# Patient Record
Sex: MALE | Race: BLACK OR AFRICAN AMERICAN | URBAN - METROPOLITAN AREA
[De-identification: names, ages, dates, MRNs, and addresses within clinical notes are randomized per-mention and may not be internally consistent; named-entity substitution may affect disease eponyms.]

---

## 2020-03-04 ENCOUNTER — EMERGENCY (EMERGENCY)
Facility: HOSPITAL | Age: 55
LOS: 1 days | Discharge: HOME | End: 2020-03-04
Attending: EMERGENCY MEDICINE | Admitting: EMERGENCY MEDICINE
Payer: SELF-PAY

## 2020-03-04 VITALS
OXYGEN SATURATION: 97 % | RESPIRATION RATE: 18 BRPM | DIASTOLIC BLOOD PRESSURE: 76 MMHG | WEIGHT: 160.06 LBS | HEIGHT: 71 IN | HEART RATE: 79 BPM | SYSTOLIC BLOOD PRESSURE: 116 MMHG | TEMPERATURE: 98 F

## 2020-03-04 DIAGNOSIS — L02.01 CUTANEOUS ABSCESS OF FACE: ICD-10-CM

## 2020-03-04 DIAGNOSIS — R22.0 LOCALIZED SWELLING, MASS AND LUMP, HEAD: ICD-10-CM

## 2020-03-04 PROCEDURE — 99284 EMERGENCY DEPT VISIT MOD MDM: CPT | Mod: 25

## 2020-03-04 PROCEDURE — 10061 I&D ABSCESS COMP/MULTIPLE: CPT

## 2020-03-04 RX ORDER — IBUPROFEN 200 MG
600 TABLET ORAL ONCE
Refills: 0 | Status: COMPLETED | OUTPATIENT
Start: 2020-03-04 | End: 2020-03-04

## 2020-03-04 RX ADMIN — Medication 600 MILLIGRAM(S): at 17:09

## 2020-03-04 RX ADMIN — Medication 100 MILLIGRAM(S): at 17:09

## 2020-03-04 NOTE — ED PROVIDER NOTE - OBJECTIVE STATEMENT
53 y/o M presents to ED c/o right side facial swelling with associated pain and drainage that began Monday. Of note, pt states he was told recently that he needed dental work and he did not follow-up. Denies trauma, headache, N/V, fever, chills, chest pain, SOB, back pain, or neck pain. 53 y/o M presents to ED c/o right side facial swelling with associated pain and drainage that began Monday after shaving. Of note, pt states he was told recently that he needed dental work and he did not follow-up, though denies pain with eating/chewing. Denies trauma, headache, N/V, fever, chills, chest pain, SOB, back pain, or neck pain.

## 2020-03-04 NOTE — ED ADULT TRIAGE NOTE - CHIEF COMPLAINT QUOTE
BIBA for right sided facial swelling/pain since Monday. as per pt, has had the swelling happen before and he needed to do dental work but did not do it. denies trauma to area. took a total of 4 Aleve today PTA.

## 2020-03-04 NOTE — ED ADULT NURSE NOTE - CHIEF COMPLAINT QUOTE
BIBA for right sided facial swelling/pain since Monday. as per pt, has had the swelling happen before and he needed to do dental work but did not do it. denies trauma to area. took a total of 4 Aleve today PTA.
180.34

## 2020-03-04 NOTE — ED PROVIDER NOTE - PHYSICAL EXAMINATION
VITAL SIGNS: I have reviewed nursing notes and confirm.  CONSTITUTIONAL: Well-developed; well-nourished; in no acute distress.  HEAD: Normocephalic; atraumatic.  EYES: PERRL, EOM intact; conjunctiva and sclera clear.  ENT: No nasal discharge; airway clear.  NECK: Supple; non tender.  CARD: S1, S2 normal; no murmurs, gallops, or rubs. Regular rate and rhythm.  RESP: Unlabored. No wheezes, rales or rhonchi.  EXT: Normal ROM. No cyanosis or edema. Non-ttp all ext, distal pulses intact  LYMPH: No acute cervical adenopathy.  NEURO: Alert, oriented. Grossly unremarkable.  PSYCH: Cooperative, appropriate.   SKIN: Swelling to right cheek with 3x3 cm area of central induration and tenderness. No gingival involvement.

## 2020-03-04 NOTE — ED PROVIDER NOTE - CHPI ED SYMPTOMS NEG
no nausea, no headache, no chest pain, no SOB, no back pain, no neck pain/no fever/no vomiting/no chills

## 2020-03-04 NOTE — ED PROVIDER NOTE - PATIENT PORTAL LINK FT
You can access the FollowMyHealth Patient Portal offered by [facility] by registering at the following website: [email or web address]. By joining [employer]’s FollowMyHealth portal, you will also be able to view your health information using other applications (apps) compatible with our system.

## 2020-03-04 NOTE — ED PROVIDER NOTE - NSFOLLOWUPINSTRUCTIONS_ED_ALL_ED_FT
Log Out.    [employer]® CareNotes®     :  [facility]             ABSCESS INCISION AND DRAINAGE - Discharge Care     Abscess Incision and Drainage    WHAT YOU NEED TO KNOW:    An abscess incision and drainage (I and D) is a procedure to drain pus from an abscess and clean it out so it can heal.    DISCHARGE INSTRUCTIONS:    Contact your healthcare provider if:     The area around your abscess has red streaks or is warm and painful.       You have a fever or chills.       You have increased redness, swelling, or pain in your wound.      Your wound does not start to heal after a few days.      Your abscess returns.       You have questions or concerns about your condition or care.    Medicines:     NSAIDs, such as ibuprofen, help decrease swelling, pain, and fever. NSAIDs can cause stomach bleeding or kidney problems in certain people. If you take blood thinner medicine, always ask your healthcare provider if NSAIDs are safe for you. Always read the medicine label and follow directions.      Take your medicine as directed. Contact your healthcare provider if you think your medicine is not helping or if you have side effects. Tell him or her if you are allergic to any medicine. Keep a list of the medicines, vitamins, and herbs you take. Include the amounts, and when and why you take them. Bring the list or the pill bottles to follow-up visits. Carry your medicine list with you in case of an emergency.    Care for your wound as directed:     Do not remove your bandage unless your healthcare provider says it is okay. Keep the bandage clean and dry. Remove your bandage and clean the wound once your healthcare provider gives you directions.       Apply heat on the bandage over your wound for 20 to 30 minutes every 2 hours for as many days as directed. This will increase blood flow to the area and help it heal.      Elevate your wound above level of your heart as often as you can. This will help decrease swelling and pain. Prop your wounded area on pillows or blankets to keep it elevated comfortably.    Follow up with your healthcare provider as directed: You may need to return in 1 to 3 days to have the gauze in your wound removed and your wound examined. You may be taught how to change the gauze in your wound. Write down your questions so you remember to ask them during your visits.

## 2020-03-04 NOTE — ED PROVIDER NOTE - CLINICAL SUMMARY MEDICAL DECISION MAKING FREE TEXT BOX
55 y/o M presents to ED with a facial abscess. On exam, pt has area of swelling to the right cheek with 3x3 cm area of central induration and tenderness. Confirmed drainable fluid with bedside US. Will perform I&D and discharge home with Rx for Abx.

## 2020-03-09 LAB
CULTURE RESULTS: SIGNIFICANT CHANGE UP
SPECIMEN SOURCE: SIGNIFICANT CHANGE UP

## 2020-08-03 ENCOUNTER — EMERGENCY (EMERGENCY)
Facility: HOSPITAL | Age: 55
LOS: 1 days | Discharge: HOME | End: 2020-08-03
Admitting: EMERGENCY MEDICINE
Payer: MEDICAID

## 2020-08-03 VITALS
RESPIRATION RATE: 16 BRPM | SYSTOLIC BLOOD PRESSURE: 167 MMHG | WEIGHT: 160.06 LBS | DIASTOLIC BLOOD PRESSURE: 96 MMHG | OXYGEN SATURATION: 97 % | HEART RATE: 68 BPM | HEIGHT: 71 IN | TEMPERATURE: 99 F

## 2020-08-03 VITALS
DIASTOLIC BLOOD PRESSURE: 90 MMHG | OXYGEN SATURATION: 100 % | TEMPERATURE: 98 F | SYSTOLIC BLOOD PRESSURE: 169 MMHG | RESPIRATION RATE: 16 BRPM | HEART RATE: 66 BPM

## 2020-08-03 LAB
ALBUMIN SERPL ELPH-MCNC: 3.1 G/DL — LOW (ref 3.4–5)
ALP SERPL-CCNC: 104 U/L — SIGNIFICANT CHANGE UP (ref 40–120)
ALT FLD-CCNC: 17 U/L — SIGNIFICANT CHANGE UP (ref 12–42)
ANION GAP SERPL CALC-SCNC: 6 MMOL/L — LOW (ref 9–16)
AST SERPL-CCNC: 23 U/L — SIGNIFICANT CHANGE UP (ref 15–37)
BASOPHILS # BLD AUTO: 0.05 K/UL — SIGNIFICANT CHANGE UP (ref 0–0.2)
BASOPHILS NFR BLD AUTO: 0.6 % — SIGNIFICANT CHANGE UP (ref 0–2)
BILIRUB SERPL-MCNC: 0.3 MG/DL — SIGNIFICANT CHANGE UP (ref 0.2–1.2)
BUN SERPL-MCNC: 11 MG/DL — SIGNIFICANT CHANGE UP (ref 7–23)
CALCIUM SERPL-MCNC: 8.7 MG/DL — SIGNIFICANT CHANGE UP (ref 8.5–10.5)
CHLORIDE SERPL-SCNC: 102 MMOL/L — SIGNIFICANT CHANGE UP (ref 96–108)
CO2 SERPL-SCNC: 32 MMOL/L — HIGH (ref 22–31)
CREAT SERPL-MCNC: 0.95 MG/DL — SIGNIFICANT CHANGE UP (ref 0.5–1.3)
EOSINOPHIL # BLD AUTO: 0.02 K/UL — SIGNIFICANT CHANGE UP (ref 0–0.5)
EOSINOPHIL NFR BLD AUTO: 0.2 % — SIGNIFICANT CHANGE UP (ref 0–6)
GLUCOSE SERPL-MCNC: 89 MG/DL — SIGNIFICANT CHANGE UP (ref 70–99)
HCT VFR BLD CALC: 39.2 % — SIGNIFICANT CHANGE UP (ref 39–50)
HGB BLD-MCNC: 13.3 G/DL — SIGNIFICANT CHANGE UP (ref 13–17)
IMM GRANULOCYTES NFR BLD AUTO: 0.3 % — SIGNIFICANT CHANGE UP (ref 0–1.5)
LYMPHOCYTES # BLD AUTO: 3.06 K/UL — SIGNIFICANT CHANGE UP (ref 1–3.3)
LYMPHOCYTES # BLD AUTO: 34.1 % — SIGNIFICANT CHANGE UP (ref 13–44)
MCHC RBC-ENTMCNC: 32.1 PG — SIGNIFICANT CHANGE UP (ref 27–34)
MCHC RBC-ENTMCNC: 33.9 GM/DL — SIGNIFICANT CHANGE UP (ref 32–36)
MCV RBC AUTO: 94.7 FL — SIGNIFICANT CHANGE UP (ref 80–100)
MONOCYTES # BLD AUTO: 0.75 K/UL — SIGNIFICANT CHANGE UP (ref 0–0.9)
MONOCYTES NFR BLD AUTO: 8.4 % — SIGNIFICANT CHANGE UP (ref 2–14)
NEUTROPHILS # BLD AUTO: 5.06 K/UL — SIGNIFICANT CHANGE UP (ref 1.8–7.4)
NEUTROPHILS NFR BLD AUTO: 56.4 % — SIGNIFICANT CHANGE UP (ref 43–77)
NRBC # BLD: 0 /100 WBCS — SIGNIFICANT CHANGE UP (ref 0–0)
PLATELET # BLD AUTO: 370 K/UL — SIGNIFICANT CHANGE UP (ref 150–400)
POTASSIUM SERPL-MCNC: 3.8 MMOL/L — SIGNIFICANT CHANGE UP (ref 3.5–5.3)
POTASSIUM SERPL-SCNC: 3.8 MMOL/L — SIGNIFICANT CHANGE UP (ref 3.5–5.3)
PROT SERPL-MCNC: 8.1 G/DL — SIGNIFICANT CHANGE UP (ref 6.4–8.2)
RBC # BLD: 4.14 M/UL — LOW (ref 4.2–5.8)
RBC # FLD: 12 % — SIGNIFICANT CHANGE UP (ref 10.3–14.5)
SODIUM SERPL-SCNC: 140 MMOL/L — SIGNIFICANT CHANGE UP (ref 132–145)
WBC # BLD: 8.97 K/UL — SIGNIFICANT CHANGE UP (ref 3.8–10.5)
WBC # FLD AUTO: 8.97 K/UL — SIGNIFICANT CHANGE UP (ref 3.8–10.5)

## 2020-08-03 PROCEDURE — 99285 EMERGENCY DEPT VISIT HI MDM: CPT

## 2020-08-03 PROCEDURE — 70487 CT MAXILLOFACIAL W/DYE: CPT | Mod: 26

## 2020-08-03 RX ORDER — LIDOCAINE 4 G/100G
5 CREAM TOPICAL ONCE
Refills: 0 | Status: COMPLETED | OUTPATIENT
Start: 2020-08-03 | End: 2020-08-03

## 2020-08-03 RX ADMIN — LIDOCAINE 5 MILLILITER(S): 4 CREAM TOPICAL at 13:30

## 2020-08-03 RX ADMIN — Medication 1 TABLET(S): at 16:04

## 2020-08-03 NOTE — ED PROVIDER NOTE - OBJECTIVE STATEMENT
54yo otherwise healthy M presents c/o left facial swelling x 1 week. pt notes he has had abscesses near his teeth before and knows he needs to see a dentist but has not. denies any fever, chills, trouble breathing, trouble swallowing, swelling of the hard or soft palate, swelling of the neck, any other concerns.

## 2020-08-03 NOTE — ED PROVIDER NOTE - PATIENT PORTAL LINK FT
You can access the FollowMyHealth Patient Portal offered by [facility] by registering at the following website: [email or web address]. By joining [employer]’s FollowMyHealth portal, you will also be able to view your health information using other applications (apps) compatible with our system.

## 2020-08-03 NOTE — ED ADULT TRIAGE NOTE - CHIEF COMPLAINT QUOTE
BIBA after left lower jaw pain and swelling, worsening since Saturday. as per pt, has reoccurring jaw pain and swelling for some time. +swelling. denies trauma.

## 2020-08-03 NOTE — ED PROVIDER NOTE - CLINICAL SUMMARY MEDICAL DECISION MAKING FREE TEXT BOX
pt presents today with concern for facial v dental abscess. pt with h/o dental abscess and poor dentition. pt has had them drained in the past. attempted drainage with local anesthesia with only small amount of drainage and minimal relief. CT max face obtained as most of the swelling externally is along the left jaw line. labs with normal WBC. CT shows abscess along the maxilla. case discussed with Dr. Obregon, and given the normal labs, tolerating PO, no submandibular involvement or palate involvement and no tracking into the neck, will d/c with abx and outpt OMFS follow up. pt verbalizes understanding of the importance of abx and follow up as well as routine dental care.

## 2020-08-03 NOTE — ED PROVIDER NOTE - NSFOLLOWUPINSTRUCTIONS_ED_ALL_ED_FT
TAKE ANTIBIOTICS AS PRESCRIBED FOR YOUR ABSCESS.     THIS WILL HOPEFULLY SHRINK THE ABSCESS.     TAKE 650MG TYLENOL EVERY 6 HOURS AS NEEDED FOR PAIN.     TAKE IBUPROFEN 600MG EVERY 6 HOURS WITH FOOD AS NEEDED FOR PAIN.     FOLLOW UP WITH THE FACIAL SURGEON FOR DRAINAGE OF THIS ABSCESS.     RETURN TO ER FOR FEVER, CHILLS, TROUBLE SWALLOWING, TROUBLE BREATHING, EXTENSION OF SWELLING DOWN YOUR NECK, TO YOUR TONGUE OR THE ROOF OR FLOOR OF YOUR MOUTH.     Abscess    An abscess is an infected area that contains a collection of pus and debris. It can occur in almost any part of the body and occurs when the tissue gets infection. Symptoms include a painful mass that is red, warm, tender that might break open and HAVE drainage. If your health care provider gave you antibiotics make sure to take the full course and do not stop even if feeling better.     SEEK IMMEDIATE MEDICAL CARE IF YOU HAVE ANY OF THE FOLLOWING SYMPTOMS: chills, fever, muscle aches, or red streaking from the area. TAKE ANTIBIOTICS AS PRESCRIBED FOR YOUR ABSCESS.     THIS WILL HOPEFULLY SHRINK THE ABSCESS.     TAKE 650MG TYLENOL EVERY 6 HOURS AS NEEDED FOR PAIN.     TAKE IBUPROFEN 600MG EVERY 6 HOURS WITH FOOD AS NEEDED FOR PAIN.     FOLLOW UP WITH THE oral SURGEON FOR DRAINAGE OF THIS ABSCESS. CALL TOMORROW MORNING FOR AN APPOINTMENT.     [facility] is located at [facility] include 234 beds at the [facility] [address] [place] [address] (corner of [facility]) in [place].    To schedule your first appointment, please call [phone].    For Emergency Services/Urgent Care, please call [phone].    RETURN TO ER FOR FEVER, CHILLS, TROUBLE SWALLOWING, TROUBLE BREATHING, EXTENSION OF SWELLING DOWN YOUR NECK, TO YOUR TONGUE OR THE ROOF OR FLOOR OF YOUR MOUTH.     Abscess    An abscess is an infected area that contains a collection of pus and debris. It can occur in almost any part of the body and occurs when the tissue gets infection. Symptoms include a painful mass that is red, warm, tender that might break open and HAVE drainage. If your health care provider gave you antibiotics make sure to take the full course and do not stop even if feeling better.     SEEK IMMEDIATE MEDICAL CARE IF YOU HAVE ANY OF THE FOLLOWING SYMPTOMS: chills, fever, muscle aches, or red streaking from the area.

## 2020-08-03 NOTE — ED PROVIDER NOTE - PROGRESS NOTE DETAILS
pt is getting impatient, requesting food. call from radiology state 2.1x3cm abscess along the left mandible without involvement of the mandible itself. discussed all results including CT showing abscess near the left mandible, inaccessible from the inside of the mouth. discussed case with Dr. Obregon who agrees with plan for abx and follow up with oral or max/face surgeon. pt tolerating PO. no signs of respiratory distress. called radiology as read still has not been put through. they report it has been dictated and the official read is in but there is possibly a delay in the system. they will pursue this. will check back for official report in computer. CT read is still pending. radiology says attending is reading it now. CT read. discussed results with Dr. Mervin Mejia ([facility]) who agrees with treatment with augmentin and f/u with oral surgeon in clinic at [facility] or [place]. will d/c.

## 2020-08-03 NOTE — ED PROVIDER NOTE - ENMT, MLM
Airway patent, Nasal mucosa clear. Mouth with normal mucosa. Throat has no vesicles, no oropharyngeal exudates and uvula is midline. normal hard and soft palate, no submandibular swelling. erythema and swelling noted along the inferior left molars with small area of fluctuance, more significant swelling and induration noted externally over the left angle of the mandible without overlying redness.

## 2020-08-07 DIAGNOSIS — R68.84 JAW PAIN: ICD-10-CM

## 2020-08-07 DIAGNOSIS — K04.7 PERIAPICAL ABSCESS WITHOUT SINUS: ICD-10-CM

## 2023-05-03 NOTE — ED PROVIDER NOTE - DATE/TIME 5
Detail Level: Zone Plan: Pt wound is healing nicely and can follow up in 6 months for annual skin check 03-Aug-2020 17:09
